# Patient Record
(demographics unavailable — no encounter records)

---

## 2024-12-05 NOTE — CONSULT LETTER
[Dear  ___] : Dear  [unfilled], [Consult Letter:] : I had the pleasure of evaluating your patient, [unfilled]. [Please see my note below.] : Please see my note below. [Consult Closing:] : Thank you very much for allowing me to participate in the care of this patient.  If you have any questions, please do not hesitate to contact me. [Sincerely,] : Sincerely, [FreeTextEntry3] : iKki Pastrana MD [facility] Physician [facility] Division of Pediatric Endocrinology

## 2024-12-05 NOTE — PAST MEDICAL HISTORY
[Post-Term] : post-term [ Section] : by  section [Non-reassuring Fetal Status] : non-reassuring fetal status [Age Appropriate] : age appropriate developmental milestones met [FreeTextEntry1] : 7 lbs 7 oz [FreeTextEntry4] : questionable pre-eclampsia, induction of labor to emergency c/s

## 2024-12-05 NOTE — FAMILY HISTORY
Rx sent for Lyrica. Please confirm dose of furosemide - Per Dr Alegria's note 10/24 does was increased from 40mg to 60mg daily?   [de-identified] : 5'4" [FreeTextEntry1] : 5'6.5" [FreeTextEntry3] : 5'2.75" [FreeTextEntry5] : with peers

## 2024-12-05 NOTE — CONSULT LETTER
[Dear  ___] : Dear  [unfilled], [Consult Letter:] : I had the pleasure of evaluating your patient, [unfilled]. [Please see my note below.] : Please see my note below. [Consult Closing:] : Thank you very much for allowing me to participate in the care of this patient.  If you have any questions, please do not hesitate to contact me. [Sincerely,] : Sincerely, [FreeTextEntry3] : Kiki Pastrana MD [facility] Physician [facility] Division of Pediatric Endocrinology

## 2024-12-05 NOTE — PHYSICAL EXAM
[Healthy Appearing] : healthy appearing [Well Nourished] : well nourished [Interactive] : interactive [Well formed] : well formed [Normally Set] : normally set [Normal S1 and S2] : normal S1 and S2 [Murmur] : no murmurs [Clear to Ausculation Bilaterally] : clear to auscultation bilaterally [Abdomen Soft] : soft [Abdomen Tenderness] : non-tender [] : no hepatosplenomegaly [Moderate] : moderate [Normal Appearance] : normal in appearance [Heriberto Stage ___] : the Heriberto stage for breast development was [unfilled] [Normal] : normal  [FreeTextEntry2] : 2-3 PH- thick, dark curly PH in sandeep 2 distribution with thin hair in sandeep 3 distribution [FreeTextEntry1] : thick breast bud in appearance but with fatty tissue extending beyond the bud

## 2024-12-05 NOTE — FAMILY HISTORY
[de-identified] : 5'4" [FreeTextEntry1] : 5'6.5" [FreeTextEntry3] : 5'2.75" [FreeTextEntry5] : with peers

## 2024-12-05 NOTE — HISTORY OF PRESENT ILLNESS
[Premenarchal] : premenarchal [FreeTextEntry2] : George is an 8y8m old girl presenting for a second opinion regarding pubertal development. She met with an endocrinologist at [facility] around 3 months ago, was diagnosed with precocious puberty and was told she will get her period at age 9. Medication to delay menarche was recomended. Family was also told she could start the medication after she gets her period as well. She had bloodwork and a bone age done. Records not available to me at the time of the visit. Mom states bone age was done at [facility] and was read as 10y.  George began breast  bud development, axillary and pubic hair development around age 7. Mom was unsure if breast development was just fat. No vaginal bleeding, spotting or discharge.  No hormonal medications or creams at home. No products with lavender or tea tree oil.  Mom to send records will try to access [facility]

## 2024-12-05 NOTE — HISTORY OF PRESENT ILLNESS
[Premenarchal] : premenarchal [FreeTextEntry2] : George is an 8y8m old girl presenting for a second opinion regarding pubertal development. She met with an endocrinologist at [facility] around 3 months ago, was diagnosed with precocious puberty and was told she will get her period at age 9. Medication to delay menarche was recomended. Family was also told she could start the medication after she gets her period as well. She had bloodwork and a bone age done. Records not available to me at the time of the visit. Mom states bone age was done at [facility] and was read as 10y.  George began breast  bud development, axillary and pubic hair development around age 7. Mom was unsure if breast development was just fat. No vaginal bleeding, spotting or discharge.  No hormonal medications or creams at home. No products with lavender or tea tree oil.  Mom to send records will try to access [facility]

## 2025-07-05 NOTE — CONSULT LETTER
[Dear  ___] : Dear  [unfilled], [Consult Letter:] : I had the pleasure of evaluating your patient, [unfilled]. [Please see my note below.] : Please see my note below. [Consult Closing:] : Thank you very much for allowing me to participate in the care of this patient.  If you have any questions, please do not hesitate to contact me. [Sincerely,] : Sincerely, [FreeTextEntry3] : Kiki Pastrana MD [facility] Physician [facility] Division of Pediatric Endocrinology

## 2025-07-05 NOTE — HISTORY OF PRESENT ILLNESS
[Premenarchal] : premenarchal [FreeTextEntry2] :  George is a 9y3m old girl presenting for follow up of precocious puberty. She presented to me in December 2024 as a second opinion. She was diagnosed with precocious puberty at [facility] (breast bud, axillary and PH around age 7) and was told she will get her period at age 9. Medication to delay menarche was recommended. Family was also told she could start the medication after she gets her period as well. She had bloodwork and a bone age done. Records not available to me at the time of the visit. Mom states bone age was done at [facility] and was read as 10y. When I saw George she was 8y8m, she had sandeep 2-3 breast development (thick breast bud with fatty tissue extending beyond the bud), sandeep 2-3 PH (terminal hair in T2 distribution, downy hair in T3) and moderate axillary hair. Mom was not interested in puberty blockade. She had an MRI of the brain with pituitary cuts on 3/14/25 which was unremarkable.  Since her last visit, GEORGE has been well with no recent illness or hospitalization. She had her underarms waxed 3 weeks ago. Pubic hair and breast development have been stable, although hard to tell is any progression. She is wearing the same size clothes and shoes as last time. No vaginal discharge, bleeding or spotting.  Mom does not want to treat with GnRHa unless George "really needs to do it."  Growth velocity: 5.21 cm/yr

## 2025-07-05 NOTE — CONSULT LETTER
[Dear  ___] : Dear  [unfilled], [Consult Letter:] : I had the pleasure of evaluating your patient, [unfilled]. [Please see my note below.] : Please see my note below. [Consult Closing:] : Thank you very much for allowing me to participate in the care of this patient.  If you have any questions, please do not hesitate to contact me. [Sincerely,] : Sincerely, [FreeTextEntry3] : Kiki Pastrana MD [facility] Physician [facility] Division of Pediatric Endocrinology